# Patient Record
Sex: MALE | Race: AMERICAN INDIAN OR ALASKA NATIVE
[De-identification: names, ages, dates, MRNs, and addresses within clinical notes are randomized per-mention and may not be internally consistent; named-entity substitution may affect disease eponyms.]

---

## 2018-07-07 ENCOUNTER — HOSPITAL ENCOUNTER (EMERGENCY)
Dept: HOSPITAL 5 - ED | Age: 33
Discharge: HOME | End: 2018-07-07
Payer: COMMERCIAL

## 2018-07-07 VITALS — SYSTOLIC BLOOD PRESSURE: 149 MMHG | DIASTOLIC BLOOD PRESSURE: 104 MMHG

## 2018-07-07 DIAGNOSIS — S01.01XA: Primary | ICD-10-CM
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
## 2018-07-07 DIAGNOSIS — F17.200: ICD-10-CM
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
## 2018-07-07 PROCEDURE — 90471 IMMUNIZATION ADMIN: CPT

## 2018-07-07 PROCEDURE — 70450 CT HEAD/BRAIN W/O DYE: CPT

## 2018-07-07 PROCEDURE — 90715 TDAP VACCINE 7 YRS/> IM: CPT

## 2018-07-07 NOTE — CAT SCAN REPORT
FINAL REPORT



EXAM:  CT HEAD/BRAIN WO CON



HISTORY:  head injury 



TECHNIQUE:  Noncontrast CT axial images of the brain. 



PRIORS:  None.



FINDINGS:  

No parenchymal mass, mass effect, hemorrhage, midline shift or

hydrocephalus. No evidence of acute cortical infarct. No

abnormal, extra-axial fluid or air collection. 



Osseous calvarium grossly intact. Mild soft tissue edema and

possible contusion in the right temporal scalp. 



IMPRESSION:  

1. No acute intracranial findings.
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
